# Patient Record
Sex: MALE | Race: WHITE
[De-identification: names, ages, dates, MRNs, and addresses within clinical notes are randomized per-mention and may not be internally consistent; named-entity substitution may affect disease eponyms.]

---

## 2017-03-30 ENCOUNTER — HOSPITAL ENCOUNTER (EMERGENCY)
Dept: HOSPITAL 80 - CED | Age: 82
Discharge: HOME | End: 2017-03-30
Payer: COMMERCIAL

## 2017-03-30 VITALS
OXYGEN SATURATION: 94 % | RESPIRATION RATE: 18 BRPM | HEART RATE: 68 BPM | TEMPERATURE: 97.5 F | DIASTOLIC BLOOD PRESSURE: 87 MMHG | SYSTOLIC BLOOD PRESSURE: 143 MMHG

## 2017-03-30 DIAGNOSIS — H00.022: Primary | ICD-10-CM

## 2017-03-30 PROCEDURE — 089Q3ZZ DRAINAGE OF RIGHT LOWER EYELID, PERCUTANEOUS APPROACH: ICD-10-PCS | Performed by: EMERGENCY MEDICINE

## 2017-03-30 NOTE — UCP[facility]
H & P


Patient Type: New


Chief Complaint Nursing Narrative: swelling. redness to r eye x 4 days. seen by 

another physician and started on keflex w/o improvement


Time Seen by Provider: 03/30/17 12:28


HPI/ROS: 





Chief Complaint:  Right lower eyelid swelling





HPI:  82-year-old gentleman presenting with 4 days of swelling to his right 

lower eyelid.  He was seen by his primary care physician's office yesterday and 

started on oral antibiotics.  Patient states that he had irritation overnight.  

This morning he noted that the lower lid is swollen and things are might be a 

"pimple" on his lid.  Denies any vision changes.  There is no discharge from 

his eye.  Has no eye complaints at this time.  He has been taking antibiotics.





ROS:  10 point Review of Systems is negative except as noted in the HPI.





PMH:  Denies


Medications:  Denies


Allergies:  No known drug allergies





Social History:  Positive for smoking since he was 13, [no] alcohol, [ no 

recreational drug use]





Family History: [non-contributory]





Physical Exam:


General:  Awake, alert, no acute distress


Eyes:  There is no conjunctival injection.  There is no discharge. He has got a 

large stye on his lower lid with some mild surrounding erythema and 

infraorbital erythema.  There is an area of pointing fluctuance at the lid 

margin.








- Family History


Significant Family History: No pertinent family hx





- Social History


Smoking Status: Current every day smoker


Constitutional: 


 Initial Vital Signs











Temperature (C)  36.4 C   03/30/17 12:28


 


Heart Rate  68   03/30/17 12:28


 


Respiratory Rate  18   03/30/17 12:28


 


Blood Pressure  143/87 H  03/30/17 12:28


 


O2 Sat (%)  94   03/30/17 12:28








 











O2 Delivery Mode               Room Air














Allergies/Adverse Reactions: 


 





No Known Allergies Allergy (Unverified 03/30/17 12:26)


 








Home Medications: 














 Medication  Instructions  Recorded


 


Claritin 10 mg  03/30/17


 


Keflex  03/30/17














Medical Decision Making


Procedures: 





Procedure:  Abscess drainage.





The patient's abscess was located on the right eyelid.  I obtained verbal 

consent from the patient to drain the abscess who was informed about the 

possibility of bleeding and pain.  The abscess was incised with 21 gauge needle 

and a moderate amount of purulent drainage was expressed.  The patient 

tolerated the procedure well.  The procedure was performed by myself.


ED Course/Re-evaluation: 





10-year-old male with a large purulent stone in his right lower eyelid.  There 

is no ocular involvement.  He has had significant relief with relief of the 

pressure from incision with an needle.  He will continue the antibiotics he is 

prescribed by his primary care physician.  Follow up in their office next week 

as scheduled.





- Data Points


Medications Given: 


 








Discontinued Medications





Proparacaine HCl (Alcaine 0.5%)  1 drops RTEYE ONCE ONE


   Stop: 03/30/17 12:37


   Last Admin: 03/30/17 12:36 Dose:  1 btl








Departure





- Departure


Disposition: Home, Routine, Self-Care


Clinical Impression: 


 Sty





Condition: Good


Instructions:  Stye (ED)


Additional Instructions: 


Continue taking her antibiotics.


Apply warm soaks with a warm wet washcloth frequently throughout the day.


Follow up with your physician next week as scheduled.  


Return to the emergency department or urgent care for increasing pain, vision 

changes, or any other concerns.


Referrals: 


Doctor Not,On Staff, MD [Primary Care Provider] - As per Instructions





- PQRS


PQRS Measurement: 








134:   Depression screening and followup, PRIME MD-PHQ2  (12 years and older)


Over the last 2 weeks, how often have you been bothered by any of the following 

problems? 


 1. Feeling down, depressed, or hopeless?


2. Little interest or pleasure in doing things? 


Patient answered no to both 1 and 2





130:  Documentation of medications.  


Reviewed all patient medications, doses, route and frequency.





226:  Do you smoke?


Yes, counseled to stop.





47:  65 and older: Advanced care planning.


Patient designates surrogate decision maker as   .


Patient refused.








51:  18 years old and older with diagnosis of COPD, spirometry performance.


Patient has no history of COPD





52:  18 years old and older with COPD and symptoms of COPD or FEV1<60% 

predicted prescribed a B Agonist.


Spirometry not performed; equipment not available.

## 2019-02-04 ENCOUNTER — HOSPITAL ENCOUNTER (OUTPATIENT)
Dept: HOSPITAL 80 - BHCLAF | Age: 84
End: 2019-02-04
Attending: INTERNAL MEDICINE
Payer: COMMERCIAL

## 2019-02-04 DIAGNOSIS — I25.10: Primary | ICD-10-CM

## 2019-02-04 DIAGNOSIS — R01.1: ICD-10-CM

## 2019-03-01 ENCOUNTER — HOSPITAL ENCOUNTER (OUTPATIENT)
Dept: HOSPITAL 80 - BHCLAF | Age: 84
End: 2019-03-01
Attending: INTERNAL MEDICINE
Payer: COMMERCIAL

## 2019-03-01 DIAGNOSIS — R01.1: ICD-10-CM

## 2019-03-01 DIAGNOSIS — I25.10: Primary | ICD-10-CM
